# Patient Record
Sex: FEMALE | Race: WHITE | NOT HISPANIC OR LATINO | Employment: FULL TIME | URBAN - METROPOLITAN AREA
[De-identification: names, ages, dates, MRNs, and addresses within clinical notes are randomized per-mention and may not be internally consistent; named-entity substitution may affect disease eponyms.]

---

## 2024-04-25 PROBLEM — R51.9 RIGHT SIDED FACIAL PAIN: Status: ACTIVE | Noted: 2024-04-25

## 2024-08-28 ENCOUNTER — PATIENT OUTREACH (OUTPATIENT)
Dept: ADMINISTRATIVE | Facility: CLINIC | Age: 39
End: 2024-08-28

## 2024-08-28 NOTE — TELEPHONE ENCOUNTER
Women's multivitamin gummy by mouth once daily  Acetaminophen 500mg by mouth 1-2 tablets by mouth PRN pain  Methocarbamol 750mg by mouth PRN pain

## 2024-08-28 NOTE — PROGRESS NOTES
C3 nurse spoke with Camille Arguello for a TCC post hospital discharge follow up call. The patient reports does not have a scheduled HOSFU appointment. C3 nurse was unable to schedule [facility]FU appointment [facility]. Patient advised to contact their PCP to schedule a HOSPFU within 5-7 days.